# Patient Record
Sex: MALE | Employment: FULL TIME | URBAN - METROPOLITAN AREA
[De-identification: names, ages, dates, MRNs, and addresses within clinical notes are randomized per-mention and may not be internally consistent; named-entity substitution may affect disease eponyms.]

---

## 2019-04-09 ENCOUNTER — APPOINTMENT (OUTPATIENT)
Dept: GENERAL RADIOLOGY | Facility: CLINIC | Age: 44
End: 2019-04-09
Attending: PHYSICIAN ASSISTANT
Payer: COMMERCIAL

## 2019-04-09 ENCOUNTER — HOSPITAL ENCOUNTER (EMERGENCY)
Facility: CLINIC | Age: 44
Discharge: HOME | End: 2019-04-09
Attending: PHYSICIAN ASSISTANT | Admitting: PHYSICIAN ASSISTANT
Payer: COMMERCIAL

## 2019-04-09 VITALS
HEIGHT: 70 IN | RESPIRATION RATE: 18 BRPM | DIASTOLIC BLOOD PRESSURE: 80 MMHG | BODY MASS INDEX: 27.92 KG/M2 | SYSTOLIC BLOOD PRESSURE: 117 MMHG | TEMPERATURE: 98.8 F | OXYGEN SATURATION: 98 % | WEIGHT: 195 LBS | HEART RATE: 100 BPM

## 2019-04-09 DIAGNOSIS — R05.9 COUGH: ICD-10-CM

## 2019-04-09 LAB
DEPRECATED S PYO AG THROAT QL EIA: NORMAL
SPECIMEN SOURCE: NORMAL

## 2019-04-09 PROCEDURE — 87880 STREP A ASSAY W/OPTIC: CPT | Performed by: PHYSICIAN ASSISTANT

## 2019-04-09 PROCEDURE — 71046 X-RAY EXAM CHEST 2 VIEWS: CPT

## 2019-04-09 PROCEDURE — 99284 EMERGENCY DEPT VISIT MOD MDM: CPT | Mod: 25

## 2019-04-09 PROCEDURE — 87081 CULTURE SCREEN ONLY: CPT | Performed by: PHYSICIAN ASSISTANT

## 2019-04-09 ASSESSMENT — ENCOUNTER SYMPTOMS
CHILLS: 1
VOMITING: 0
SORE THROAT: 1
SHORTNESS OF BREATH: 1
FEVER: 0
DIAPHORESIS: 1
NAUSEA: 0
COUGH: 1
ABDOMINAL PAIN: 0
TROUBLE SWALLOWING: 1

## 2019-04-09 ASSESSMENT — MIFFLIN-ST. JEOR: SCORE: 1795.76

## 2019-04-09 NOTE — ED TRIAGE NOTES
"ABCs intact. Pt c/o \"phlegm stuck in my throat\" starting yesterday. Pt states he was \"vomiting but nothing is coming up\".   "

## 2019-04-09 NOTE — ED PROVIDER NOTES
"  History     Chief Complaint:  Cough    HPI   Juan Daniel Ibrahim is a 43 year old male who presents to the emergency department with one day of productive cough and chills. The patient denies recent exposure to viral cough or cold illnesses. He reports that yesterday evening he developed a cough which has been productive in nature with persistent congestion and phlegm that has been \"choking [him].\" He endorses associated chills and hot sweats, but has not had any known fevers. The patient reports that he has been coughing to the point of dry-heaving, although he has not had any active vomiting and denies nausea. He has had a mild sore throat as well and reports difficulty swallowing and breathing secondary to this and his congestion, but denies any further concerns or symptoms and is otherwise healthy at baseline.     Allergies:  No known drug allergies     Medications:    The patient is not currently taking any prescribed medications.     Past Medical History:    The patient does not have any past pertinent medical history.     Past Surgical History:    History reviewed. No pertinent surgical history.     Family History:    History reviewed. No pertinent family history.      Social History:  The patient was not accompanied to the ED.  Smoking Status: Not on file  Smokeless Tobacco: Not on file  Alcohol Use: Not on file      Review of Systems   Constitutional: Positive for chills and diaphoresis. Negative for fever.   HENT: Positive for congestion, sore throat and trouble swallowing.    Respiratory: Positive for cough and shortness of breath.    Gastrointestinal: Negative for abdominal pain, nausea and vomiting.   All other systems reviewed and are negative.      Physical Exam     Patient Vitals for the past 24 hrs:   BP Temp Temp src Pulse Resp SpO2 Height Weight   04/09/19 1822 117/80 98.8  F (37.1  C) Temporal 100 18 98 % 1.778 m (5' 10\") 88.5 kg (195 lb)       Physical Exam  Constitutional: well appearing, no " "acute distress.   Head: No external signs of trauma noted to head or face.   Eyes: Pupils are equal, round, and reactive to light. Conjunctiva normal  ENT: MMM. Oropharynx clear and moist without tonsillar enlargement or exudate. Normal voice. No stridor. No significant secretions noted in oropharynx.   Neck: no lymphadenopathy. Normal ROM.  Cardiovascular: Normal rate, regular rhythm, and intact distal pulses.    Respiratory: Effort normal. No respiratory distress. Lungs clear to auscultation bilaterally.   GI: Soft. There is no tenderness.  Musculoskeletal: No deformities appreciated. Normal ROM. No edema noted.  Neurological: Alert and Oriented x 3. Speech normal. Moves all extremities equally.   Psychiatric: Appropriate mood, affect, and behavior.   Skin: Skin is warm and dry.       Emergency Department Course     Imaging:  Radiology findings were communicated with the patient who voiced understanding of the findings.    Chest XR 2 Views:  IMPRESSION: No active infiltrates.    As read by radiology    Laboratory:  Laboratory findings were communicated with the patient who voiced understanding of the findings.    Rapid Strep Test: negative   Strep Culture: Pending    Emergency Department Course:  Nursing notes and vitals reviewed.    1827: I performed an exam of the patient as documented above.     I rechecked the patient. Findings and plan explained to the Patient. Patient discharged home with instructions regarding supportive care, medications, and reasons to return. The importance of close follow-up was reviewed.     Impression & Plan      Medical Decision Makin year old male presenting with cough and mucous in his throat. Reports that he is \"choking\" on the mucous, but there is no evidence of voice changes, significant secretions in oropharynx, stridor, or other concerning findings. He is able to speak and swallow without any difficulties. There is no respiratory distress, tachypnea, or wheezing. CXR " does not reveal any evidence of pneumonia or pulmonary edema. Rapid strep is negative. There is no evidence of other retropharyngeal or deep space neck infections. I suspect his symptoms are either related to viral URI or allergies. I recommended he try over the counter cold and allergy medications. He was instructed to follow-up with clinic in 2-3 days if symptoms are not improving. Instructed to return to the ED for any new or worsening symptoms, including difficulty breathing or swallowing, hemoptysis, fever, or other concerning symptoms.     Diagnosis:    ICD-10-CM    1. Cough R05        Disposition:  discharged to home      Leyla Hernandez  4/9/2019   [facility] EMERGENCY DEPARTMENT  I, Leyla Hernandez, am serving as a scribe at 6:27 PM on 4/9/2019 to document services personally performed by Heidi Lynn PA-C based on my observations and the provider's statements to me.      Heidi Lynn PA-C  04/09/19 2045

## 2019-04-09 NOTE — ED AVS SNAPSHOT
[facility] Emergency Department  [address]  [facility] [address]  Phone:  [phone]  Fax:  [phone]                                    Juan Daniel Ibrahim   MRN: [id number]    Department:  [facility] Emergency Department   Date of Visit:  4/9/2019           After Visit Summary Signature Page    I have received my discharge instructions, and my questions have been answered. I have discussed any challenges I see with this plan with the nurse or doctor.    ..........................................................................................................................................  Patient/Patient Representative Signature      ..........................................................................................................................................  Patient Representative Print Name and Relationship to Patient    ..................................................               ................................................  Date                                   Time    ..........................................................................................................................................  Reviewed by Signature/Title    ...................................................              ..............................................  Date                                               Time          22EPIC Rev 08/18

## 2019-04-10 NOTE — RESULT ENCOUNTER NOTE
Preliminary Beta strep group A r/o culture is PENDING and/or NEGATIVE at this time.   No changes in treatment per [facility] Strep protocol.

## 2019-04-11 LAB
BACTERIA SPEC CULT: NORMAL
Lab: NORMAL
SPECIMEN SOURCE: NORMAL

## 2019-04-11 NOTE — RESULT ENCOUNTER NOTE
Final Beta strep group A r/o culture is NEGATIVE for Group A streptococcus.    No treatment or change in treatment per [facility] Strep protocol.

## 2023-07-10 ENCOUNTER — TRANSCRIBE ORDERS (OUTPATIENT)
Dept: URGENT CARE | Facility: CLINIC | Age: 48
End: 2023-07-10
Payer: COMMERCIAL

## 2023-07-10 DIAGNOSIS — M54.30 SCIATICA, UNSPECIFIED LATERALITY: Primary | ICD-10-CM
